# Patient Record
Sex: FEMALE | Race: WHITE | NOT HISPANIC OR LATINO
[De-identification: names, ages, dates, MRNs, and addresses within clinical notes are randomized per-mention and may not be internally consistent; named-entity substitution may affect disease eponyms.]

---

## 2022-09-27 ENCOUNTER — APPOINTMENT (OUTPATIENT)
Dept: ORTHOPEDIC SURGERY | Facility: CLINIC | Age: 52
End: 2022-09-27

## 2022-09-27 ENCOUNTER — TRANSCRIPTION ENCOUNTER (OUTPATIENT)
Age: 52
End: 2022-09-27

## 2022-09-27 PROBLEM — Z00.00 ENCOUNTER FOR PREVENTIVE HEALTH EXAMINATION: Status: ACTIVE | Noted: 2022-09-27

## 2022-09-27 PROCEDURE — 99203 OFFICE O/P NEW LOW 30 MIN: CPT | Mod: 25

## 2022-09-27 PROCEDURE — 73130 X-RAY EXAM OF HAND: CPT | Mod: RT

## 2022-09-27 PROCEDURE — 20600 DRAIN/INJ JOINT/BURSA W/O US: CPT | Mod: F5

## 2022-09-27 NOTE — PHYSICAL EXAM
[Right] : right hand [A1-Pulley] : A1-pulley [IP Joint] : IP joint [Finger Flexion] : finger flexion [Finger Extension] : finger extension [5___] : pinch 5[unfilled]/5 [1st] : 1st [] : good capillary refill in all fingers [de-identified] :  mild swelling of thumb noted

## 2022-09-27 NOTE — IMAGING
[de-identified] :  X-rays taken of the patient's right hand in the office today revealed no obvious fractures, subluxations, or dislocations.  No significant abnormalities are seen.

## 2022-09-27 NOTE — DISCUSSION/SUMMARY
[de-identified] :   Patient will take OTC NSAIDs as needed for pain.  The patient was advised to rest/ice the area and can alternate with warm compresses as needed.  Gentle ROM exercises in Epsom salt and warm water was encouraged. \par \par With the patient's approval, and under sterile technique, I performed a steroid injection today.  See the attached procedure note for further details.  Explained to the patient that the full effect of the injection will take 3-5 days to kick in. \par \par Patient will follow-up in 4-6 weeks for further evaluation.  All of the patient's questions/concerns were answered in detail.  \par \par The patient was seen under the supervision of Dr. Ritchie.\par

## 2022-09-27 NOTE — HISTORY OF PRESENT ILLNESS
[de-identified] :  Patient is a 51-year-old female who reports the office for evaluation of her right thumb pain that has been aggravating her for the past several days.  Denies any direct trauma or injury to the area.  Admits that her thumb locks on her.  Range of motion palpating certain areas of the thumb aggravate the patient's pain.  Denies any numbness or tingling.

## 2022-09-27 NOTE — PROCEDURE
[Small Joint Injection] : small joint injection [Right] : of the right [1st] : 1st trigger finger [Pain] : pain [Alcohol] : alcohol [____] : [unfilled] [] : Patient tolerated procedure well [Call if redness, pain or fever occur] : call if redness, pain or fever occur [Apply ice for 15min out of every hour for the next 12-24 hours as tolerated] : apply ice for 15 minutes out of every hour for the next 12-24 hours as tolerated

## 2022-10-31 ENCOUNTER — APPOINTMENT (OUTPATIENT)
Dept: ORTHOPEDIC SURGERY | Facility: CLINIC | Age: 52
End: 2022-10-31

## 2022-10-31 VITALS — BODY MASS INDEX: 29.23 KG/M2 | HEIGHT: 63 IN | WEIGHT: 165 LBS

## 2022-10-31 DIAGNOSIS — M65.311 TRIGGER THUMB, RIGHT THUMB: ICD-10-CM

## 2022-10-31 PROCEDURE — 99213 OFFICE O/P EST LOW 20 MIN: CPT

## 2022-10-31 NOTE — HISTORY OF PRESENT ILLNESS
[de-identified] : 52-year-old female with a right-sided trigger thumb she received a cortisone injection at her last visit did not help her symptoms at all.  And she comes in for evaluation today.

## 2022-10-31 NOTE — PHYSICAL EXAM
[de-identified] :   Patient has tenderness to palpation A1 pulley right thumb.  There is triggering present there is normal sensation normal capillary refill.  There is no erythema ecchymoses or abrasions.

## 2022-10-31 NOTE — ASSESSMENT
[FreeTextEntry1] :   Patient is right-sided trigger thumb.  Patient lot for surgical intervention for right trigger thumb release risks and benefits of surgical not limited to bleeding infection risk injury stiffness discussed with the patient id postoperative course is discussed.  It he will see us back on an thumb surgical intervention for trigger thumb release

## 2022-11-13 ENCOUNTER — LABORATORY RESULT (OUTPATIENT)
Age: 52
End: 2022-11-13

## 2022-11-15 NOTE — ASU PATIENT PROFILE, ADULT - FALL HARM RISK - UNIVERSAL INTERVENTIONS
Bed in lowest position, wheels locked, appropriate side rails in place/Call bell, personal items and telephone in reach/Instruct patient to call for assistance before getting out of bed or chair/Non-slip footwear when patient is out of bed/[place] to call system/Physically safe environment - no spills, clutter or unnecessary equipment/Purposeful Proactive Rounding/Room/bathroom lighting operational, light cord in reach

## 2022-11-16 ENCOUNTER — OUTPATIENT (OUTPATIENT)
Dept: OUTPATIENT SERVICES | Facility: HOSPITAL | Age: 52
LOS: 1 days | Discharge: HOME | End: 2022-11-16

## 2022-11-16 ENCOUNTER — APPOINTMENT (OUTPATIENT)
Age: 52
End: 2022-11-16

## 2022-11-16 ENCOUNTER — TRANSCRIPTION ENCOUNTER (OUTPATIENT)
Age: 52
End: 2022-11-16

## 2022-11-16 VITALS
RESPIRATION RATE: 20 BRPM | OXYGEN SATURATION: 100 % | SYSTOLIC BLOOD PRESSURE: 144 MMHG | HEART RATE: 79 BPM | DIASTOLIC BLOOD PRESSURE: 91 MMHG | TEMPERATURE: 98 F

## 2022-11-16 VITALS
RESPIRATION RATE: 18 BRPM | WEIGHT: 156.09 LBS | HEART RATE: 90 BPM | DIASTOLIC BLOOD PRESSURE: 96 MMHG | SYSTOLIC BLOOD PRESSURE: 157 MMHG | OXYGEN SATURATION: 99 % | HEIGHT: 63 IN | TEMPERATURE: 98 F

## 2022-11-16 PROCEDURE — 26055 INCISE FINGER TENDON SHEATH: CPT | Mod: F5

## 2022-11-16 RX ORDER — LOSARTAN POTASSIUM 100 MG/1
1 TABLET, FILM COATED ORAL
Qty: 0 | Refills: 0 | DISCHARGE

## 2022-11-16 RX ORDER — IBUPROFEN 200 MG
1 TABLET ORAL
Qty: 20 | Refills: 0
Start: 2022-11-16

## 2022-11-16 NOTE — ASU DISCHARGE PLAN (ADULT/PEDIATRIC) - CARE PROVIDER_API CALL
Ezio Goins)  Orthopaedic Surgery  [address] [place] [address]  Phone: [phone]  Fax: [phone]  Established Patient  Follow Up Time:

## 2022-11-16 NOTE — ASU DISCHARGE PLAN (ADULT/PEDIATRIC) - NS MD DC FALL RISK RISK
For information on Fall & Injury Prevention, visit: [email or web address] OR  [email or web address] OR  https://www.cdc.gov/steadi/patient.html

## 2022-11-18 DIAGNOSIS — M65.311 TRIGGER THUMB, RIGHT THUMB: ICD-10-CM

## 2022-11-23 ENCOUNTER — APPOINTMENT (OUTPATIENT)
Dept: ORTHOPEDIC SURGERY | Facility: CLINIC | Age: 52
End: 2022-11-23

## 2022-11-23 VITALS — HEIGHT: 63 IN | BODY MASS INDEX: 29.23 KG/M2 | WEIGHT: 165 LBS

## 2022-11-23 VITALS — WEIGHT: 165 LBS | HEIGHT: 63 IN | BODY MASS INDEX: 29.23 KG/M2

## 2022-11-23 PROCEDURE — 99024 POSTOP FOLLOW-UP VISIT: CPT

## 2022-11-23 NOTE — DISCUSSION/SUMMARY
[de-identified] : Today I removed the sutures.  \par The patient will work on range of motion and scar massage.  \par Swelling of the digit can linger for 4-6 weeks. \par Follow up prn if there is continued pain and/or swelling at that time.\par

## 2022-11-23 NOTE — HISTORY OF PRESENT ILLNESS
[de-identified] : Patient is a 52-year-old female here for her 1st postop appointment.  She is status post a right thumb trigger finger release done by Dr. Goins.  She is doing well.

## 2022-11-23 NOTE — PHYSICAL EXAM
[de-identified] : Physical exam of her right thumb:  Resolving swelling and ecchymosis.  The wound is clean and dry.  No signs of drainage, pus or infection. Good motion of the digits.\par
